# Patient Record
Sex: FEMALE | Race: WHITE | NOT HISPANIC OR LATINO
[De-identification: names, ages, dates, MRNs, and addresses within clinical notes are randomized per-mention and may not be internally consistent; named-entity substitution may affect disease eponyms.]

---

## 2021-10-21 ENCOUNTER — LABORATORY RESULT (OUTPATIENT)
Age: 61
End: 2021-10-21

## 2021-10-21 ENCOUNTER — APPOINTMENT (OUTPATIENT)
Dept: SURGERY | Facility: CLINIC | Age: 61
End: 2021-10-21
Payer: COMMERCIAL

## 2021-10-21 VITALS
BODY MASS INDEX: 24.59 KG/M2 | HEIGHT: 64 IN | WEIGHT: 144 LBS | SYSTOLIC BLOOD PRESSURE: 112 MMHG | HEART RATE: 98 BPM | DIASTOLIC BLOOD PRESSURE: 77 MMHG

## 2021-10-21 DIAGNOSIS — Z80.52 FAMILY HISTORY OF MALIGNANT NEOPLASM OF BLADDER: ICD-10-CM

## 2021-10-21 DIAGNOSIS — Z78.9 OTHER SPECIFIED HEALTH STATUS: ICD-10-CM

## 2021-10-21 DIAGNOSIS — Z80.8 FAMILY HISTORY OF MALIGNANT NEOPLASM OF OTHER ORGANS OR SYSTEMS: ICD-10-CM

## 2021-10-21 DIAGNOSIS — Z87.09 PERSONAL HISTORY OF OTHER DISEASES OF THE RESPIRATORY SYSTEM: ICD-10-CM

## 2021-10-21 DIAGNOSIS — F12.90 CANNABIS USE, UNSPECIFIED, UNCOMPLICATED: ICD-10-CM

## 2021-10-21 DIAGNOSIS — Z86.59 PERSONAL HISTORY OF OTHER MENTAL AND BEHAVIORAL DISORDERS: ICD-10-CM

## 2021-10-21 DIAGNOSIS — Z00.00 ENCOUNTER FOR GENERAL ADULT MEDICAL EXAMINATION W/OUT ABNORMAL FINDINGS: ICD-10-CM

## 2021-10-21 DIAGNOSIS — Z78.0 ASYMPTOMATIC MENOPAUSAL STATE: ICD-10-CM

## 2021-10-21 PROCEDURE — 10005 FNA BX W/US GDN 1ST LES: CPT

## 2021-10-21 PROCEDURE — 99204 OFFICE O/P NEW MOD 45 MIN: CPT | Mod: 25

## 2021-10-21 PROCEDURE — 36415 COLL VENOUS BLD VENIPUNCTURE: CPT

## 2021-10-21 RX ORDER — BUDESONIDE AND FORMOTEROL FUMARATE DIHYDRATE 160; 4.5 UG/1; UG/1
AEROSOL RESPIRATORY (INHALATION)
Refills: 0 | Status: ACTIVE | COMMUNITY

## 2021-10-21 RX ORDER — ALBUTEROL 90 MCG
AEROSOL (GRAM) INHALATION
Refills: 0 | Status: ACTIVE | COMMUNITY

## 2021-10-21 RX ORDER — RABEPRAZOLE SODIUM 20 MG/1
TABLET, DELAYED RELEASE ORAL
Refills: 0 | Status: ACTIVE | COMMUNITY

## 2021-10-21 RX ORDER — BUPROPION HYDROCHLORIDE 150 MG/1
150 TABLET, EXTENDED RELEASE ORAL
Refills: 0 | Status: ACTIVE | COMMUNITY

## 2021-10-21 RX ORDER — FLUTICASONE PROPIONATE 93 UG/1
93 SPRAY, METERED NASAL
Refills: 0 | Status: ACTIVE | COMMUNITY

## 2021-10-21 RX ORDER — TIOTROPIUM BROMIDE 18 UG/1
18 CAPSULE ORAL; RESPIRATORY (INHALATION)
Refills: 0 | Status: ACTIVE | COMMUNITY

## 2021-10-21 RX ORDER — BUPROPION HYDROCHLORIDE 300 MG/1
300 TABLET, EXTENDED RELEASE ORAL
Refills: 0 | Status: ACTIVE | COMMUNITY

## 2021-10-21 RX ORDER — MONTELUKAST SODIUM 10 MG/1
10 TABLET, FILM COATED ORAL
Refills: 0 | Status: ACTIVE | COMMUNITY

## 2021-10-22 LAB
CALCIUM SERPL-MCNC: 10 MG/DL
CALCIUM SERPL-MCNC: 10 MG/DL
PARATHYROID HORMONE INTACT: 45 PG/ML
T3 SERPL-MCNC: 151 NG/DL
T4 FREE SERPL-MCNC: 1.1 NG/DL
TSH SERPL-ACNC: 3.61 UIU/ML

## 2021-10-23 PROBLEM — Z78.0 HISTORY OF MENOPAUSE: Status: RESOLVED | Noted: 2021-10-23 | Resolved: 2021-10-23

## 2021-10-23 PROBLEM — Z86.59 HISTORY OF DEPRESSION: Status: RESOLVED | Noted: 2021-10-23 | Resolved: 2021-10-23

## 2021-10-23 PROBLEM — Z87.09 HISTORY OF EXTRINSIC ASTHMA: Status: RESOLVED | Noted: 2021-10-23 | Resolved: 2021-10-23

## 2021-10-23 LAB
24R-OH-CALCIDIOL SERPL-MCNC: 63 PG/ML
THYROGLOB AB SERPL-ACNC: ABNORMAL IU/ML
THYROPEROXIDASE AB SERPL IA-ACNC: 3398 IU/ML

## 2021-10-23 NOTE — HISTORY OF PRESENT ILLNESS
[de-identified] : Pt c/o Thyroid nodules and dysphagia.   denies hoarseness, SOB or RT exposure\par sonogram:  Right 1.2 cm, Left 1.9 cm and 2.5 cm thyroid nodules\par TSH 5.26, T3  112, T4  1/0\par I have reviewed all old and new data and available images.\par

## 2021-10-23 NOTE — REASON FOR VISIT
[Initial Consultation] : an initial consultation for [FreeTextEntry2] : Thyroid nodules [Other: _____] : [unfilled]

## 2021-10-23 NOTE — ASSESSMENT
[FreeTextEntry1] : bloods drawn. sonogram guided fine needle aspiration cytology left thyroid nodule performed. will observe other nodules. discussed that size of nodule is below the threshold for which fine needle aspiration cytology is generally recommended in the absence of any suspicious sonographic or clinical findings.   to call next week for results .   patient has been given the opportunity to ask questions, and all of the patient's questions have been answered to their satisfaction

## 2021-10-23 NOTE — CONSULT LETTER
[Dear  ___] : Dear  [unfilled], [Consult Letter:] : I had the pleasure of evaluating your patient, [unfilled]. [Please see my note below.] : Please see my note below. [Consult Closing:] : Thank you very much for allowing me to participate in the care of this patient.  If you have any questions, please do not hesitate to contact me. [Sincerely,] : Sincerely, [FreeTextEntry2] : Dr. Naga Ramirez, Dr. Trell Barrett, Dr. Tyler Hernandez [FreeTextEntry3] : Yariel Bill MD, FACS\par System Director, Endocrine Surgery\par [facility]\par Assistant Clinical Professor of Surgery\par [employer] [facility]  [DrAlessio  ___] : Dr. PAYNE [DrAlessio ___] : Dr. PAYNE

## 2021-10-23 NOTE — PHYSICAL EXAM
[de-identified] : diffuse thyroid thickening with 1 cm right and 2 cm left thyroid nodules, well circumscribed and mobile [Laryngoscopy Performed] : laryngoscopy was performed, see procedure section for findings [Midline] : located in midline position [Normal] : orientation to person, place, and time: normal [de-identified] : indirect  laryngoscopy shows normal vocal cord mobility bilaterally with no lesions noted, arytenoid erythema noted

## 2021-10-25 ENCOUNTER — NON-APPOINTMENT (OUTPATIENT)
Age: 61
End: 2021-10-25

## 2021-10-25 DIAGNOSIS — E06.3 AUTOIMMUNE THYROIDITIS: ICD-10-CM

## 2022-04-07 ENCOUNTER — NON-APPOINTMENT (OUTPATIENT)
Age: 62
End: 2022-04-07

## 2022-04-11 ENCOUNTER — NON-APPOINTMENT (OUTPATIENT)
Age: 62
End: 2022-04-11

## 2022-04-26 ENCOUNTER — APPOINTMENT (OUTPATIENT)
Dept: SURGERY | Facility: CLINIC | Age: 62
End: 2022-04-26

## 2022-04-29 ENCOUNTER — NON-APPOINTMENT (OUTPATIENT)
Age: 62
End: 2022-04-29

## 2024-07-11 ENCOUNTER — APPOINTMENT (OUTPATIENT)
Dept: ORTHOPEDIC SURGERY | Facility: CLINIC | Age: 64
End: 2024-07-11
Payer: MEDICARE

## 2024-07-11 VITALS — BODY MASS INDEX: 24.75 KG/M2 | HEIGHT: 64 IN | WEIGHT: 145 LBS

## 2024-07-11 DIAGNOSIS — M18.0 BILATERAL PRIMARY OSTEOARTHRITIS OF FIRST CARPOMETACARPAL JOINTS: ICD-10-CM

## 2024-07-11 PROCEDURE — 73130 X-RAY EXAM OF HAND: CPT | Mod: 50

## 2024-07-11 PROCEDURE — 99203 OFFICE O/P NEW LOW 30 MIN: CPT

## 2025-07-09 NOTE — END OF VISIT
----- Message from Research Coordinator Lilia MEJIA Clinical Research Coordinator sent at 7/9/2025  9:14 AM PDT -----  Regarding: Victorion Plaque Beta Blocker  Hi,    Can you place an order for her pre-CCTA metoprolol? Last time she got 2 tabs of 25mg - took 1 tab night before and 1 tab morning before scan.    thx   [Time Spent: ___ minutes] : I have spent [unfilled] minutes of time on the encounter.